# Patient Record
Sex: MALE | Race: WHITE | Employment: UNEMPLOYED | RURAL
[De-identification: names, ages, dates, MRNs, and addresses within clinical notes are randomized per-mention and may not be internally consistent; named-entity substitution may affect disease eponyms.]

---

## 2017-01-03 PROBLEM — R33.9 URINARY RETENTION: Status: ACTIVE | Noted: 2017-01-03

## 2017-01-03 PROBLEM — H10.9 BILATERAL CONJUNCTIVITIS: Status: ACTIVE | Noted: 2017-01-03

## 2017-01-03 PROBLEM — N30.01 ACUTE CYSTITIS WITH HEMATURIA: Status: ACTIVE | Noted: 2017-01-03

## 2017-01-03 PROBLEM — K56.41 FECAL IMPACTION (HCC): Status: ACTIVE | Noted: 2017-01-03

## 2017-01-03 PROBLEM — R93.89 ABNORMAL CT SCAN: Status: ACTIVE | Noted: 2017-01-03

## 2017-01-04 PROBLEM — E87.6 HYPOKALEMIA: Status: ACTIVE | Noted: 2017-01-04

## 2019-10-23 ENCOUNTER — APPOINTMENT (OUTPATIENT)
Dept: ULTRASOUND IMAGING | Facility: HOSPITAL | Age: 59
End: 2019-10-23
Payer: MEDICAID

## 2019-10-23 ENCOUNTER — HOSPITAL ENCOUNTER (EMERGENCY)
Facility: HOSPITAL | Age: 59
Discharge: ANOTHER HOSPITAL | End: 2019-10-23
Attending: EMERGENCY MEDICINE
Payer: MEDICAID

## 2019-10-23 ENCOUNTER — APPOINTMENT (OUTPATIENT)
Dept: GENERAL RADIOLOGY | Facility: HOSPITAL | Age: 59
End: 2019-10-23
Payer: MEDICAID

## 2019-10-23 VITALS
HEIGHT: 63 IN | WEIGHT: 160 LBS | SYSTOLIC BLOOD PRESSURE: 124 MMHG | OXYGEN SATURATION: 94 % | RESPIRATION RATE: 20 BRPM | HEART RATE: 85 BPM | DIASTOLIC BLOOD PRESSURE: 98 MMHG | TEMPERATURE: 97.7 F | BODY MASS INDEX: 28.35 KG/M2

## 2019-10-23 DIAGNOSIS — S42.91XA TRAUMATIC CLOSED DISPLACED FRACTURE OF RIGHT SHOULDER WITH ANTERIOR DISLOCATION, INITIAL ENCOUNTER: ICD-10-CM

## 2019-10-23 DIAGNOSIS — S42.341A CLOSED DISPLACED SPIRAL FRACTURE OF SHAFT OF RIGHT HUMERUS, INITIAL ENCOUNTER: Primary | ICD-10-CM

## 2019-10-23 LAB
A/G RATIO: 0.9 (ref 0.8–2)
ALBUMIN SERPL-MCNC: 3.5 G/DL (ref 3.4–4.8)
ALP BLD-CCNC: 92 U/L (ref 25–100)
ALT SERPL-CCNC: 20 U/L (ref 4–36)
ANION GAP SERPL CALCULATED.3IONS-SCNC: 12 MMOL/L (ref 3–16)
AST SERPL-CCNC: 26 U/L (ref 8–33)
BASOPHILS ABSOLUTE: 0 K/UL (ref 0–0.1)
BASOPHILS RELATIVE PERCENT: 0.3 %
BILIRUB SERPL-MCNC: 0.5 MG/DL (ref 0.3–1.2)
BUN BLDV-MCNC: 21 MG/DL (ref 6–20)
CALCIUM SERPL-MCNC: 9 MG/DL (ref 8.5–10.5)
CHLORIDE BLD-SCNC: 103 MMOL/L (ref 98–107)
CO2: 29 MMOL/L (ref 20–30)
CREAT SERPL-MCNC: <0.5 MG/DL (ref 0.4–1.2)
EOSINOPHILS ABSOLUTE: 0.1 K/UL (ref 0–0.4)
EOSINOPHILS RELATIVE PERCENT: 1 %
GFR AFRICAN AMERICAN: >59
GFR NON-AFRICAN AMERICAN: >59
GLOBULIN: 4.1 G/DL
GLUCOSE BLD-MCNC: 135 MG/DL (ref 74–106)
HCT VFR BLD CALC: 38.6 % (ref 40–54)
HEMOGLOBIN: 12.6 G/DL (ref 13–18)
IMMATURE GRANULOCYTES #: 0 K/UL
IMMATURE GRANULOCYTES %: 0.1 % (ref 0–5)
LACTIC ACID: 1.8 MMOL/L (ref 0.4–2)
LYMPHOCYTES ABSOLUTE: 1.6 K/UL (ref 1.5–4)
LYMPHOCYTES RELATIVE PERCENT: 22.7 %
MCH RBC QN AUTO: 33.7 PG (ref 27–32)
MCHC RBC AUTO-ENTMCNC: 32.6 G/DL (ref 31–35)
MCV RBC AUTO: 103.2 FL (ref 80–100)
MONOCYTES ABSOLUTE: 0.9 K/UL (ref 0.2–0.8)
MONOCYTES RELATIVE PERCENT: 13.3 %
NEUTROPHILS ABSOLUTE: 4.4 K/UL (ref 2–7.5)
NEUTROPHILS RELATIVE PERCENT: 62.6 %
PDW BLD-RTO: 13.3 % (ref 11–16)
PLATELET # BLD: 140 K/UL (ref 150–400)
PMV BLD AUTO: 12.4 FL (ref 6–10)
POTASSIUM SERPL-SCNC: 4.1 MMOL/L (ref 3.4–5.1)
RBC # BLD: 3.74 M/UL (ref 4.5–6)
SODIUM BLD-SCNC: 144 MMOL/L (ref 136–145)
TOTAL PROTEIN: 7.6 G/DL (ref 6.4–8.3)
WBC # BLD: 7.1 K/UL (ref 4–11)

## 2019-10-23 PROCEDURE — 80053 COMPREHEN METABOLIC PANEL: CPT

## 2019-10-23 PROCEDURE — 96374 THER/PROPH/DIAG INJ IV PUSH: CPT

## 2019-10-23 PROCEDURE — 99284 EMERGENCY DEPT VISIT MOD MDM: CPT

## 2019-10-23 PROCEDURE — 36415 COLL VENOUS BLD VENIPUNCTURE: CPT

## 2019-10-23 PROCEDURE — 85025 COMPLETE CBC W/AUTO DIFF WBC: CPT

## 2019-10-23 PROCEDURE — 6360000002 HC RX W HCPCS: Performed by: EMERGENCY MEDICINE

## 2019-10-23 PROCEDURE — 93971 EXTREMITY STUDY: CPT

## 2019-10-23 PROCEDURE — 73060 X-RAY EXAM OF HUMERUS: CPT

## 2019-10-23 PROCEDURE — 96375 TX/PRO/DX INJ NEW DRUG ADDON: CPT

## 2019-10-23 PROCEDURE — 83605 ASSAY OF LACTIC ACID: CPT

## 2019-10-23 PROCEDURE — 73070 X-RAY EXAM OF ELBOW: CPT

## 2019-10-23 RX ORDER — MORPHINE SULFATE 4 MG/ML
4 INJECTION, SOLUTION INTRAMUSCULAR; INTRAVENOUS ONCE
Status: COMPLETED | OUTPATIENT
Start: 2019-10-23 | End: 2019-10-23

## 2019-10-23 RX ORDER — ONDANSETRON 2 MG/ML
4 INJECTION INTRAMUSCULAR; INTRAVENOUS ONCE
Status: COMPLETED | OUTPATIENT
Start: 2019-10-23 | End: 2019-10-23

## 2019-10-23 RX ADMIN — ONDANSETRON 4 MG: 2 INJECTION INTRAMUSCULAR; INTRAVENOUS at 13:56

## 2019-10-23 RX ADMIN — MORPHINE SULFATE 4 MG: 4 INJECTION INTRAVENOUS at 13:59

## 2019-10-23 ASSESSMENT — ENCOUNTER SYMPTOMS
TROUBLE SWALLOWING: 0
WHEEZING: 0
DIARRHEA: 0
NAUSEA: 0
SINUS PRESSURE: 0
EYE REDNESS: 0
RHINORRHEA: 0
BACK PAIN: 0
EYE DISCHARGE: 0
COUGH: 0
ABDOMINAL PAIN: 0
SORE THROAT: 0
CONSTIPATION: 0
EYE PAIN: 0
SHORTNESS OF BREATH: 0
CHEST TIGHTNESS: 0
VOMITING: 0

## 2021-01-07 ENCOUNTER — HOSPITAL ENCOUNTER (EMERGENCY)
Facility: HOSPITAL | Age: 61
Discharge: ANOTHER HOSPITAL | End: 2021-01-08
Attending: EMERGENCY MEDICINE
Payer: MEDICAID

## 2021-01-07 ENCOUNTER — APPOINTMENT (OUTPATIENT)
Dept: CT IMAGING | Facility: HOSPITAL | Age: 61
End: 2021-01-07
Payer: MEDICAID

## 2021-01-07 DIAGNOSIS — K56.7 ILEUS (HCC): ICD-10-CM

## 2021-01-07 DIAGNOSIS — U07.1 COVID-19 VIRUS RNA DETECTED: ICD-10-CM

## 2021-01-07 DIAGNOSIS — K59.39 MEGACOLON: ICD-10-CM

## 2021-01-07 DIAGNOSIS — J18.9 PNEUMONIA DUE TO ORGANISM: ICD-10-CM

## 2021-01-07 DIAGNOSIS — K59.00 CONSTIPATION, UNSPECIFIED CONSTIPATION TYPE: Primary | ICD-10-CM

## 2021-01-07 DIAGNOSIS — N30.01 ACUTE CYSTITIS WITH HEMATURIA: ICD-10-CM

## 2021-01-07 LAB
A/G RATIO: 0.8 (ref 0.8–2)
ALBUMIN SERPL-MCNC: 3 G/DL (ref 3.4–4.8)
ALP BLD-CCNC: 98 U/L (ref 25–100)
ALT SERPL-CCNC: 28 U/L (ref 4–36)
ANION GAP SERPL CALCULATED.3IONS-SCNC: 9 MMOL/L (ref 3–16)
AST SERPL-CCNC: 30 U/L (ref 8–33)
BACTERIA: ABNORMAL /HPF
BASOPHILS ABSOLUTE: 0 K/UL (ref 0–0.1)
BASOPHILS RELATIVE PERCENT: 0.2 %
BILIRUB SERPL-MCNC: 0.5 MG/DL (ref 0.3–1.2)
BILIRUBIN URINE: ABNORMAL
BLOOD, URINE: ABNORMAL
BUN BLDV-MCNC: 31 MG/DL (ref 6–20)
CALCIUM SERPL-MCNC: 9.4 MG/DL (ref 8.5–10.5)
CHLORIDE BLD-SCNC: 104 MMOL/L (ref 98–107)
CLARITY: CLEAR
CO2: 30 MMOL/L (ref 20–30)
COLOR: ABNORMAL
CREAT SERPL-MCNC: <0.5 MG/DL (ref 0.4–1.2)
EOSINOPHILS ABSOLUTE: 0.1 K/UL (ref 0–0.4)
EOSINOPHILS RELATIVE PERCENT: 0.7 %
EPITHELIAL CELLS, UA: ABNORMAL /HPF (ref 0–5)
GFR AFRICAN AMERICAN: >59
GFR NON-AFRICAN AMERICAN: >59
GLOBULIN: 3.7 G/DL
GLUCOSE BLD-MCNC: 135 MG/DL (ref 74–106)
GLUCOSE URINE: NEGATIVE MG/DL
HCT VFR BLD CALC: 43.3 % (ref 40–54)
HEMOGLOBIN: 13.4 G/DL (ref 13–18)
IMMATURE GRANULOCYTES #: 0 K/UL
IMMATURE GRANULOCYTES %: 0.2 % (ref 0–5)
KETONES, URINE: 15 MG/DL
LEUKOCYTE ESTERASE, URINE: ABNORMAL
LYMPHOCYTES ABSOLUTE: 1 K/UL (ref 1.5–4)
LYMPHOCYTES RELATIVE PERCENT: 11.4 %
MAGNESIUM: 2.1 MG/DL (ref 1.7–2.4)
MCH RBC QN AUTO: 32.1 PG (ref 27–32)
MCHC RBC AUTO-ENTMCNC: 30.9 G/DL (ref 31–35)
MCV RBC AUTO: 103.6 FL (ref 80–100)
MICROSCOPIC EXAMINATION: YES
MONOCYTES ABSOLUTE: 0.6 K/UL (ref 0.2–0.8)
MONOCYTES RELATIVE PERCENT: 7.5 %
NEUTROPHILS ABSOLUTE: 6.7 K/UL (ref 2–7.5)
NEUTROPHILS RELATIVE PERCENT: 80 %
NITRITE, URINE: NEGATIVE
PDW BLD-RTO: 13 % (ref 11–16)
PH UA: >=9 (ref 5–8)
PLATELET # BLD: 193 K/UL (ref 150–400)
PMV BLD AUTO: 12.1 FL (ref 6–10)
POTASSIUM REFLEX MAGNESIUM: 3.3 MMOL/L (ref 3.4–5.1)
PROTEIN UA: 100 MG/DL
RBC # BLD: 4.18 M/UL (ref 4.5–6)
RBC UA: ABNORMAL /HPF (ref 0–4)
SARS-COV-2, NAAT: DETECTED
SARS-COV-2, PCR: ABNORMAL
SODIUM BLD-SCNC: 143 MMOL/L (ref 136–145)
SPECIFIC GRAVITY UA: 1.02 (ref 1–1.03)
TOTAL PROTEIN: 6.7 G/DL (ref 6.4–8.3)
URINE REFLEX TO CULTURE: YES
URINE TYPE: ABNORMAL
UROBILINOGEN, URINE: >=8 E.U./DL
WBC # BLD: 8.4 K/UL (ref 4–11)
WBC UA: ABNORMAL /HPF (ref 0–5)

## 2021-01-07 PROCEDURE — 87086 URINE CULTURE/COLONY COUNT: CPT

## 2021-01-07 PROCEDURE — 74176 CT ABD & PELVIS W/O CONTRAST: CPT

## 2021-01-07 PROCEDURE — 36415 COLL VENOUS BLD VENIPUNCTURE: CPT

## 2021-01-07 PROCEDURE — 6370000000 HC RX 637 (ALT 250 FOR IP): Performed by: EMERGENCY MEDICINE

## 2021-01-07 PROCEDURE — 83735 ASSAY OF MAGNESIUM: CPT

## 2021-01-07 PROCEDURE — 71250 CT THORAX DX C-: CPT

## 2021-01-07 PROCEDURE — 80053 COMPREHEN METABOLIC PANEL: CPT

## 2021-01-07 PROCEDURE — 2580000003 HC RX 258: Performed by: EMERGENCY MEDICINE

## 2021-01-07 PROCEDURE — 96365 THER/PROPH/DIAG IV INF INIT: CPT

## 2021-01-07 PROCEDURE — 96366 THER/PROPH/DIAG IV INF ADDON: CPT

## 2021-01-07 PROCEDURE — 51701 INSERT BLADDER CATHETER: CPT

## 2021-01-07 PROCEDURE — 85025 COMPLETE CBC W/AUTO DIFF WBC: CPT

## 2021-01-07 PROCEDURE — 81001 URINALYSIS AUTO W/SCOPE: CPT

## 2021-01-07 PROCEDURE — U0003 INFECTIOUS AGENT DETECTION BY NUCLEIC ACID (DNA OR RNA); SEVERE ACUTE RESPIRATORY SYNDROME CORONAVIRUS 2 (SARS-COV-2) (CORONAVIRUS DISEASE [COVID-19]), AMPLIFIED PROBE TECHNIQUE, MAKING USE OF HIGH THROUGHPUT TECHNOLOGIES AS DESCRIBED BY CMS-2020-01-R: HCPCS

## 2021-01-07 PROCEDURE — 99285 EMERGENCY DEPT VISIT HI MDM: CPT

## 2021-01-07 PROCEDURE — U0002 COVID-19 LAB TEST NON-CDC: HCPCS

## 2021-01-07 RX ORDER — LANOLIN ALCOHOL/MO/W.PET/CERES
100 CREAM (GRAM) TOPICAL DAILY
COMMUNITY

## 2021-01-07 RX ORDER — ZINC SULFATE 50(220)MG
50 CAPSULE ORAL DAILY
COMMUNITY

## 2021-01-07 RX ORDER — ERGOCALCIFEROL 1.25 MG/1
50000 CAPSULE ORAL WEEKLY
COMMUNITY

## 2021-01-07 RX ORDER — 0.9 % SODIUM CHLORIDE 0.9 %
500 INTRAVENOUS SOLUTION INTRAVENOUS ONCE
Status: COMPLETED | OUTPATIENT
Start: 2021-01-07 | End: 2021-01-08

## 2021-01-07 RX ORDER — LYSINE HCL 500 MG
1 TABLET ORAL 3 TIMES DAILY
COMMUNITY

## 2021-01-07 RX ORDER — DIMETHICONE, OXYBENZONE, AND PADIMATE O 2; 2.5; 6.6 G/100G; G/100G; G/100G
1 STICK TOPICAL PRN
COMMUNITY

## 2021-01-07 RX ORDER — TRIAMCINOLONE ACETONIDE 1 MG/G
1 CREAM TOPICAL
COMMUNITY

## 2021-01-07 RX ORDER — SULFAMETHOXAZOLE AND TRIMETHOPRIM 200; 40 MG/5ML; MG/5ML
160 SUSPENSION ORAL ONCE
Status: COMPLETED | OUTPATIENT
Start: 2021-01-08 | End: 2021-01-08

## 2021-01-07 RX ORDER — BACITRACIN 500 [USP'U]/G
1 OINTMENT TOPICAL PRN
COMMUNITY

## 2021-01-07 RX ORDER — NYSTATIN 100000 [USP'U]/G
1 POWDER TOPICAL
COMMUNITY

## 2021-01-07 RX ADMIN — SODIUM CHLORIDE 500 ML: 9 INJECTION, SOLUTION INTRAVENOUS at 22:26

## 2021-01-07 RX ADMIN — POTASSIUM BICARBONATE 20 MEQ: 782 TABLET, EFFERVESCENT ORAL at 22:30

## 2021-01-07 NOTE — ED NOTES
Report from [facility]. Rahul Harkins \"doesn't communicate\" gloria shelton. Pt with hx of toxic megacolon. Pt also has hx of MR. He has abd distention since this morning. He is having + bm and is also on a bowel care regimen but continues to be distended today. vss 147/94  Hr 87  rr 20  95% r/a. Pt is bed bound temp 98.1 pt has also had his g-tube replaced this week.        Ottoniel Haro RN  01/07/21 2325

## 2021-01-08 VITALS
WEIGHT: 160 LBS | HEIGHT: 63 IN | SYSTOLIC BLOOD PRESSURE: 153 MMHG | HEART RATE: 100 BPM | OXYGEN SATURATION: 92 % | DIASTOLIC BLOOD PRESSURE: 80 MMHG | TEMPERATURE: 99.6 F | BODY MASS INDEX: 28.35 KG/M2

## 2021-01-08 PROCEDURE — 6370000000 HC RX 637 (ALT 250 FOR IP): Performed by: EMERGENCY MEDICINE

## 2021-01-08 PROCEDURE — 2500000003 HC RX 250 WO HCPCS

## 2021-01-08 RX ORDER — CLINDAMYCIN PHOSPHATE 600 MG/50ML
INJECTION INTRAVENOUS
Status: COMPLETED
Start: 2021-01-08 | End: 2021-01-08

## 2021-01-08 RX ADMIN — CLINDAMYCIN IN 5 PERCENT DEXTROSE 300 MG: 12 INJECTION, SOLUTION INTRAVENOUS at 00:38

## 2021-01-08 RX ADMIN — Medication 20 ML: at 00:38

## 2021-01-08 NOTE — ED NOTES
Patient is a Barber of the [facility] is Reed Ramsay - [phone]   Updated Reed Ramsay [facility]  01/08/21 3623

## 2021-01-08 NOTE — ED NOTES
Spoke with [email or web address] will try [address]. [employer] and [employer].       Aline Thompson, RN  01/08/21 7420

## 2021-01-08 NOTE — ED NOTES
[facility] and spoke with [place]. I informed [facility] that we were checking on the bed status of the patient. She informed me that her co worker would be calling us with information.       [address]  01/08/21 0021

## 2021-01-08 NOTE — ED NOTES
Spoke with [facility]. Patient to be referred to [facility] [place] for review for possible transfer.       Michael Tapia RN  01/08/21 0040

## 2021-01-08 NOTE — ED NOTES
iu did accompany pt once again to ct scan for ct scan of chest.     Willian Christopher RN  01/07/21 3009

## 2021-01-08 NOTE — ED NOTES
Report given to Federico Rasheed RN at [facility] AT [facility].       Ann Frank RN  01/08/21 0711

## 2021-01-08 NOTE — ED NOTES
2328pm   RN called [facility] for more information regarding patient. Requested most recent H&P. Update given to NH regarding upcoming transfer. patient is a b[place] of the state with state guardian Opal Walls. Patient's mother is also a resident of [facility], and she is in her 80s. Patient with history of COVID-19. Patient first tested positive on October 12th, 2020. RN informed Nursing Home that patient had tested positive again today 1/7/2021. NH appreciative of information and will make D.O.N. aware. Patient with no negative test since his positive in October.          Augustine Randall RN  01/07/21 2255

## 2021-01-08 NOTE — ED NOTES
There are no beds at [facility] or [facility].       Ulices Gurrola RN  01/08/21 4843

## 2021-01-08 NOTE — ED NOTES
I was present with md at bedside for pt assessment prior to triage. Rectal exam performed by md, rectal tempt obtained by me 99.6 pt with soft, greenish stool noted. Per md no fecal impaction noted. Pt cleaned and new brief applied. Straight cath performed for urine sample. I did also assist radiology to ct scan for pt scan of abd and pelvis.      Casey Giron RN  01/07/21 2002

## 2021-01-08 NOTE — ED NOTES
[employer] access called and states patient has been accepted to [facility].  Call transferred to MD Lounge MD Higinio Phalen per [facility] access request.      Aggie Otero RN  01/08/21 8945

## 2021-01-08 NOTE — ED NOTES
RN spoke with Jan Diana at [facility]. Patient referred to [facility] at this time. Awaiting call back at this time. Diagnoses:  Constipation/megacolon  Ileus  Pneumonitis  COVID-19 positive. No available beds on our medical unit.        Nickolas Gill RN  01/07/21 8755

## 2021-01-08 NOTE — ED PROVIDER NOTES
[address] [place] [address] Court  eMERGENCY dEPARTMENT eNCOUnter      Pt Name: Rogers Gonzales  MRN: [id number]  [place] 1960  Date of evaluation: 1/7/2021  Provider: Holly Parsons MD    [address] [place] [address]       Chief Complaint   Patient presents with    Abdominal Pain     and distention         HISTORY OF PRESENT ILLNESS   (Location/Symptom, Timing/Onset, Context/Setting, Quality, Duration, Modifying Factors, Severity)  Note limiting factors. Rogers Gonzales is a 62 y.o. male who presents to the emergency department with NH reports of abd distention. Pt is blind and nonverbal, has h/o megacolon and constipation. Per EMS, pt was impacted and underwent manual disimpaction two days ago. He has a PEG tube and is on polyethylene glycol daily, and lactulose prn He gets bolus tube feeds. No reports of vomiting or irritability. Pt arrives lying supine, with leg contractures at knees. Pt is awake and alert. RN calls NH. Pt is barrow of the [facility] and has a guardian. Pt tested pos for COVID Oct 12. No subsequent testing was done, per NH RN.    Nursing Notes were reviewed. REVIEW OF SYSTEMS    (2-9 systems for level 4, 10 or more forlevel 5)     Review of Systems   Reason unable to perform ROS: pt nonverbal.     Except as noted above the remainder of the review of systems was reviewed and negative. PAST MEDICAL HISTORY     Past Medical History:   Diagnosis Date    Aphasia     Blindness, both eyes     Contracture, unspecified joint     Dysphagia     Eczema     Epilepsy ([address]     Feeding by G-tube ([address]     GERD (gastroesophageal reflux disease)     History of MRSA infection     Mental retardation     Muscle weakness     Osteoporosis     Pneumonia     Schizoaffective disorder, unspecified ([address]     Unspecified intellectual disabilities          SURGICALHISTORY     History reviewed. No pertinent surgical history.       CURRENT MEDICATIONS       Current Discharge Medication List      CONTINUE these medications which have NOT CHANGED    Details   thiamine 100 MG tablet 100 mg daily Indications: crushed g tube      triamcinolone (KENALOG) 0.1 % cream Apply 1 each topically Once a day on wed? Topically to scalp at bedtime      zinc oxide 20 % ointment Apply 1 each topically as needed for Dry Skin Apply topically to g tube site then ovdd every shift. zinc sulfate (ZINCATE) 220 (50 Zn) MG capsule 50 mg by Per G Tube route daily Crushed      Calcium Carb-Cholecalciferol (CALCIUM 500 + D) 500-400 MG-UNIT TABS 1 tablet by Gastric Tube route three times daily 2200, 0200, 0600      vitamin D (ERGOCALCIFEROL) 1.25 MG (28491 UT) CAPS capsule 50,000 Units by Per G Tube route once a week ONCE A DAY ON WED, EVERY WEEK ON WEDNESDAY      nystatin (MYCOSTATIN) 790694 UNIT/GM powder Apply 1 each topically Apply topically to redness to underneath right arm q shift until resolved. metoclopramide (REGLAN) 5 MG tablet Take 5 mg by mouth 3 times daily      HYDROcodone-acetaminophen (NORCO) 5-325 MG per tablet Take one tablet per tube every 4 hours as needed for pain.   Qty: 4 tablet, Refills: 0      LORazepam (ATIVAN) 0.5 MG tablet 1 tablet by Per G Tube route 2 times daily  Qty: 4 tablet, Refills: 0      bisacodyl (DULCOLAX) 10 MG suppository Place 1 suppository rectally daily  Qty: 30 suppository, Refills: 0      linaclotide (LINZESS) 145 MCG capsule 1 capsule by PEG Tube route every morning (before breakfast)  Qty: 30 capsule, Refills: 0      lactulose (CHRONULAC) 10 GM/15ML solution Take 30 mLs by mouth daily as needed (constipation)  Qty: 1 Bottle, Refills: 0      Nutritional Supplements (JEVITY 1.5 BILL) LIQD 237 mLs by Tube route 5 times daily Bolus one can 237 ml of jecity 1.5 per g-tube 5x per day at 0600, 1000, 1400, 1800, 2200      sodium chloride 1 G tablet 1 g by Tube route 2 times daily       ferrous sulfate 220 (44 FE) MG/5ML solution 330 mg by Tube route daily      loratadine (CLARITIN) 10 MG tablet 10 mg by Tube route daily      Multiple Vitamins-Minerals (SUPER THERA LYNDSAY M) TABS 1 tablet by Tube route daily      Docusate Sodium 100 MG TABS 2.5 tablets by Tube route daily      furosemide (LASIX) 20 MG tablet 20 mg by Tube route daily      !! Valproate Sodium (VALPROIC ACID) 250 MG/5ML SOLN 250 mg by Tube route every 8 hours       levETIRAcetam (KEPPRA) 100 MG/ML solution 250 mg by Tube route 2 times daily       metoprolol tartrate (LOPRESSOR) 50 MG tablet 50 mg by Tube route 2 times daily      polyethylene glycol (GLYCOLAX) powder 17 g by Tube route daily as needed      medicated lip balm (BLISTEX/CARMEX) 2-2.5-6.6 % STCK Apply 1 each topically as needed for Dry Lips TO LIPS AS NEEDED      Calcium Carbonate-Vitamin D (OYSTER SHELL CALCIUM/D PO) 1 tablet by Tube route 3 times daily Indications: @ 10pm, 2am, 6am 500 mg tablet      !! Valproate Sodium (VALPROIC ACID) 250 MG/5ML SOLN Take 15 mLs by mouth nightly      acetaminophen (TYLENOL) 160 MG/5ML liquid Take 480 mg by mouth every 4 hours as needed for Fever or Pain       !! - Potential duplicate medications found. Please discuss with provider. ALLERGIES     Aminoglycosides, Ceftriaxone, Cephalosporins, Ciprofloxacin, Eggs or egg-derived products, Gentamicin, Poultry meal, and Quinolones    FAMILY HISTORY     No family history on file.        SOCIAL HISTORY       Social History     Socioeconomic History    Marital status: Single     Spouse name: None    Number of children: None    Years of education: None    Highest education level: None   Occupational History    None   Social Needs    Financial resource strain: None    Food insecurity     Worry: None     Inability: None    Transportation needs     Medical: None     Non-medical: None   Tobacco Use    Smoking status: Unknown If Ever Smoked   Substance and Sexual Activity    Alcohol use: None    Drug use: None    Sexual activity: None   Lifestyle    Physical activity     Days per week: None Minutes per session: None    Stress: None   Relationships    Social connections     Talks on phone: None     Gets together: None     Attends Buddhist service: None     Active member of club or organization: None     Attends meetings of clubs or organizations: None     Relationship status: None    Intimate partner violence     Fear of current or ex partner: None     Emotionally abused: None     Physically abused: None     Forced sexual activity: None   Other Topics Concern    None   Social History Narrative    None       SCREENINGS      @FLOW([id number])@      PHYSICAL EXAM    (up to 7 for level 4, 8 or more for level 5)     ED Triage Vitals   BP Temp Temp src Pulse Resp SpO2 Height Weight   -- -- -- -- -- -- -- --       Physical Exam  Exam conducted with a chaperone present Zak Stafford RN, present to assist with rectal exam, diaper change, and to do cath for UA). Constitutional:       General: He is not in acute distress. Appearance: He is not ill-appearing, toxic-appearing or diaphoretic. Comments: Small wm lies with eyes open, seems to track voices. At times will grab nurse's hands when she started to cleanse him for urinary cath. NAD   Eyes:      Comments: White, opaque iris and pupils   Cardiovascular:      Rate and Rhythm: Normal rate and regular rhythm. Pulses: Normal pulses. Pulmonary:      Effort: Pulmonary effort is normal.      Breath sounds: Normal breath sounds. Abdominal:      General: There is distension. Tenderness: There is no abdominal tenderness. There is no guarding or rebound. Hernia: No hernia is present. Comments: Moderately distended abd with hypoactive BS. No apparent TTP as pt allows palpation without grimace, moaning, withdrawal. No mass. Rectal exam: normal tone and normal vault size. No impacted stool palpated. Pt had a large soft BM just before rectal exam.   Genitourinary:     Penis: Uncircumcised.        Testes: Normal.      Rectum: Normal. Skin:     General: Skin is warm and dry. Neurological:      Mental Status: He is alert. DIAGNOSTIC RESULTS     EKG: All EKG's are interpreted by the Emergency Department Physician who either signs or Co-signs this chart in the absence of a cardiologist.        RADIOLOGY:   Non-plain filmimages such as CT, Ultrasound and MRI are read by the radiologist. Plain radiographic images are visualized and preliminarily interpreted by the emergency physician with the below findings:        Interpretation per the Radiologist below, if available at the time ofthis note:    CT CHEST [address]   Final Result   1. Multifocal nodular airspace disease predominantly in the right upper lobe. Differential includes aspiration pneumonitis, metastatic disease and atypical pneumonia. 2. Chronic bronchovascular crowding the right lung base   3. Small right pleural effusion   4. Discoid atelectasis the left lung base. 5. Minimal patchy disease in the left upper lobe and superior segment left lower lobe. CT ABDOMEN PELVIS WO CONTRAST Additional Contrast? None   Final Result   1. Evidence of megacolon with markedly retained fluid-filled stool, and/or impaction with dilatation of the sigmoid colon measuring 14 cm in diameter. 2. Chronic dilatation of the transverse colon when compared to prior study of 2017   3. Interval development of small bowel dilatation compatible with secondary ileus as the descending colon itself is not dilated   4.  No nephrolithiasis or obstruction            ED BEDSIDE ULTRASOUND:   Performed by ED Physician - none    LABS:  Labs Reviewed   CBC WITH AUTO DIFFERENTIAL - Abnormal; Notable for the following components:       Result Value    RBC 4.18 (*)     .6 (*)     MCH 32.1 (*)     MCHC 30.9 (*)     MPV 12.1 (*)     Lymphocytes Absolute 1.0 (*)     All other components within normal limits    Narrative:     Performed at:  [address] [facility]  [id number] [place] [address]   Phone [phone]   COMPREHENSIVE METABOLIC PANEL W/ REFLEX TO MG FOR LOW K - Abnormal; Notable for the following components:    Potassium reflex Magnesium 3.3 (*)     Glucose 135 (*)     BUN 31 (*)     Alb 3.0 (*)     All other components within normal limits    Narrative:     Performed at:  [address] [place] [address] Laboratory  [address] [place] [address],  [place], [place] [address]   Phone [phone]   URINE RT REFLEX TO CULTURE - Abnormal; Notable for the following components:    Bilirubin Urine SMALL (*)     Ketones, Urine 15 (*)     Blood, Urine LARGE (*)     pH, UA >=9.0 (*)     Protein,  (*)     Urobilinogen, Urine >=8.0 (*)     Leukocyte Esterase, Urine SMALL (*)     All other components within normal limits    Narrative:     Performed at:  [address] [place] [address] Laboratory  [address] [place] [address],  [place], [address]   Phone [phone] - Abnormal; Notable for the following components:    WBC, UA 21-50 (*)     RBC, UA 21-50 (*)     Bacteria, UA 1+ (*)     All other components within normal limits    Narrative:     Performed at:  [address] [place] [address] Laboratory  [address] [place] [address],  [place], [address]   Phone [phone] - Abnormal; Notable for the following components:    SARS-CoV-2, NAAT DETECTED (*)     All other components within normal limits    Narrative:     Vibha Thornton tel. [phone],  Panic results handed to WISAM MASTERS/JUAN JOSE, 01/07/2021 22:55, by Jamal Wayne  Performed at:  [address] [place] [address] Laboratory  [address] [place] [address],  [place], [address]   Phone [phone]   CULTURE, URINE   MAGNESIUM    Narrative:     Performed at:  [address] [place] [address] Laboratory  [address] [place] [address],  [place], [address]   Phone [phone]       All other labs were within normal range or not returned as of this dictation. EMERGENCY DEPARTMENT COURSE and DIFFERENTIAL DIAGNOSIS/MDM:   Vitals:    Vitals:    01/08/21 0015 01/08/21 0030 01/08/21 0045 01/08/21 0100   BP: (!) 146/90 131/75 (!) 144/91 (!) 153/80   Pulse:       Temp:       TempSrc:       SpO2:       Weight:       Height:           PATIENT RECHECK:   2300  No COVID beds here (no staffing); no beds [facility] (\"not even a waiting list\" per call center). Trying [facility]. CRITICAL CARE TIME   Total Critical Care time was 0 minutes, excluding separately reportable procedures. There was a high probability of clinically significant/life threatening deterioration in the patient's condition which required my urgent intervention. CONSULTS:  Radiologist called with CT A/P report. Dilated colon 14 cm vs. 7 cm on previous CT, plus SB dilation. Also recommends CT chest as ground-glass opac seen on lung windows of A/P studies. [address] [facility] [address]. Marina Pate, hospitalist, accepts pt.     [facility] calls back to convey that [facility] has no beds tonight. Will try [facility]. 0023  [facility] calls back: no beds available at [facility]. Will try [address] calls back. Dr. Alejandro Elizalde says they do not have GI coverage. [facility] ED MD: no beds available. Will try [place]. [place] [facility] calls back. Surgeon does not accept to his service but will consult if hospitalist accepts and calls him. [facility], ED MD, accepts pt to [facility]. Will arrange transport. PROCEDURES:  None    FINAL IMPRESSION      1. Constipation, unspecified constipation type    2. Megacolon    3. Ileus ([address]    4. Pneumonia due to organism    5. COVID-19 virus RNA detected    6. Acute cystitis with hematuria          DISPOSITION/PLAN   DISPOSITION        PATIENT REFERRED TO:  No follow-up provider specified.     DISCHARGE MEDICATIONS:  Current Discharge Medication List          (Please note that portions of this note were completed with a voice recognition program.  Efforts were made to edit the dictations but occasionally words aremis-transcribed.)    Shyam Jeong MD (electronically signed)  Attending Emergency Physician          Shyam Jeong MD  01/08/21 5265

## 2021-01-08 NOTE — ED NOTES
Pt. d'tito in [address] [place] [address]. cond. at time of transfer to [facility]. Center. Left with [facility] in satis. cond.        Lamona Fabry, RN  01/08/21 2310

## 2021-01-08 NOTE — ED NOTES
R has no beds. Patient referred to [facility]um and [facility] at this time per [facility].       Silverio Auguste RN  01/07/21 7868

## 2021-01-09 LAB — URINE CULTURE, ROUTINE: NORMAL
